# Patient Record
(demographics unavailable — no encounter records)

---

## 2024-10-30 NOTE — HISTORY OF PRESENT ILLNESS
[de-identified] : 44yo male presents complaining of left thigh and groin pain for 2 months.  On March 3, 2024 he sustained an injury at work.  He works for the [employer] he was lifting heavy bags of concrete twisted felt a pop and deep within his groin.  He reported immediate severe pain at that time went to a local ER evaluated.  He saw Dr. Vanegas sent for MRI [facility] radiology of the left hip.  Diagnosed with abductor tendon tears.  He does feel better he is improving over the last few weeks.  He is taking over-the-counter NSAIDs as needed.  Here today for further hip consultation.  Denies numbness tingling He has not yet been back to work.  He reports that he missed physical therapy for greater than a month because it was not authorized he is now back in physical therapy his pain is somewhat improved although not yet to the point where he is back to work  Since his last visit he been doing physical therapy note some improvement he still reports pain and stiffness in his hip

## 2024-10-30 NOTE — PHYSICAL EXAM
[de-identified] : Left hip exam  Skin: Clean/dry and intact Inspection: No obvious deformity, no swelling, no ecchymosis. Tenderness: no tenderness over greater trochanter/glut medius insertion. No tenderness pubic symphysis, pubic tubercle, hip flexors. No ttp ischial tuberosity or buttock. No ttp over the ASIS/Illiac crest.  Diffuse tenderness in the abductor compartment ROM: 0-120. Internal rotation 30 external rotation 70 Painful ROM: None Additional tests: No pain with circumduction negative impingement test at 90 mildly positive impingement test at 60 negative Vin negative [facility] Strength: 5/5 hip flexion/ADD/ABD/Q/H/TA/GS/EHL Neuro: Sensation in tact to light touch throughout in dp/sp/tib/mary/saph distributions Pulses: 2+ DP/PT pulses

## 2024-10-30 NOTE — DISCUSSION/SUMMARY
[de-identified] : 45-year-old male history of an abductor brevis and mc tears improving he is able to walk at this time he can continue to sit but is not able to do any sort of heavy lifting or long distance walking.  Physical therapy is medically necessary at this time we will follow-up in 6 to 8 weeks

## 2024-11-13 NOTE — HISTORY OF PRESENT ILLNESS
[de-identified] :   The patient's past medical history, past surgical history, medications, allergies, and social history were reviewed by me today with the patient and documented accordingly. In addition, the patient's family history, which is noncontributory to this visit, was also reviewed.

## 2024-11-13 NOTE — PHYSICAL EXAM
[de-identified] : General Exam   Well developed, well nourished No apparent distress Oriented to person, place, and time Mood: Normal Affect: Normal Balance and coordination: Normal Gait: Normal

## 2024-11-22 NOTE — PHYSICAL EXAM
[de-identified] : General Exam   Well developed, well nourished No apparent distress Oriented to person, place, and time Mood: Normal Affect: Normal Balance and coordination: Normal Gait: Normal  Left hip exam  	Skin: Clean/dry and intact 	Inspection: No obvious deformity, no swelling, no ecchymosis. 	Tenderness: No tenderness over greater trochanter/glut medius insertion. No tenderness pubic symphysis, pubic tubercle, hip flexors. No ttp ischial tuberosity or buttock. No ttp over the ASIS/Illiac crest.  Mild tenderness along hip abductors 	ROM: [0 to 120 degrees internal rotation 30 external rotation 70.  There is mild pain with passive abduction mild pain with figure-of-four position 	Additional tests: Negative impingement test, no pain with circumduction 	Strength: 5/5 hip flexion/ADD/ABD/Q/H/TA/GS/EHL 	Neuro: Sensation in tact to light touch throughout in dp/sp/tib/mary/saph distributions 	Pulses: 2+ DP/PT pulses

## 2024-11-22 NOTE — DISCUSSION/SUMMARY
[de-identified] : Left hip abductor tear.  Improving.  Continue exercise program work note given to return January 2, 2025 he may follow-up as needed.

## 2024-11-22 NOTE — HISTORY OF PRESENT ILLNESS
[FreeTextEntry1] :  45-year-old male history of a left hip abductor tear.  He reports is been doing his exercises on his own including the Jacuzzi he has not gone to physical therapy lately he does not feel like he is ready to return to work just yet but does feel like he can return after the holidays.  He did not show up to his last 2 appointments The patient's past medical history, past surgical history, medications, allergies, and social history were reviewed by me today with the patient and documented accordingly. In addition, the patient's family history, which is noncontributory to this visit, was also reviewed.

## 2025-03-28 NOTE — PHYSICAL EXAM
[de-identified] : General Exam   Well developed, well nourished No apparent distress Oriented to person, place, and time Mood: Normal Affect: Normal Balance and coordination: Normal Gait: Normal  Left hip exam  	Skin: Clean/dry and intact 	Inspection: No obvious deformity, no swelling, no ecchymosis. 	Tenderness: No tenderness over greater trochanter/glut medius insertion. No tenderness pubic symphysis, pubic tubercle, hip flexors. No ttp ischial tuberosity or buttock. No ttp over the ASIS/Illiac crest.  Mild tenderness along hip abductors 	ROM: [0 to 120 degrees internal rotation 30 external rotation 70.  There is mild pain with passive abduction mild pain with figure-of-four position 	Additional tests: Negative impingement test, no pain with circumduction 	Strength: 5/5 hip flexion/ADD/ABD/Q/H/TA/GS/EHL 	Neuro: Sensation in tact to light touch throughout in dp/sp/tib/mary/saph distributions 	Pulses: 2+ DP/PT pulses

## 2025-03-28 NOTE — DISCUSSION/SUMMARY
[de-identified] : Left hip abductor tear.  Improving.  Continue exercise program work note given to return January 2, 2025 he may follow-up as needed.

## 2025-03-28 NOTE — DISCUSSION/SUMMARY
[de-identified] : Left hip abductor tear.  Improving.  Continue exercise program work note given to return January 2, 2025 he may follow-up as needed.

## 2025-03-28 NOTE — PHYSICAL EXAM
[de-identified] : General Exam   Well developed, well nourished No apparent distress Oriented to person, place, and time Mood: Normal Affect: Normal Balance and coordination: Normal Gait: Normal  Left hip exam  	Skin: Clean/dry and intact 	Inspection: No obvious deformity, no swelling, no ecchymosis. 	Tenderness: No tenderness over greater trochanter/glut medius insertion. No tenderness pubic symphysis, pubic tubercle, hip flexors. No ttp ischial tuberosity or buttock. No ttp over the ASIS/Illiac crest.  Mild tenderness along hip abductors 	ROM: [0 to 120 degrees internal rotation 30 external rotation 70.  There is mild pain with passive abduction mild pain with figure-of-four position 	Additional tests: Negative impingement test, no pain with circumduction 	Strength: 5/5 hip flexion/ADD/ABD/Q/H/TA/GS/EHL 	Neuro: Sensation in tact to light touch throughout in dp/sp/tib/mary/saph distributions 	Pulses: 2+ DP/PT pulses